# Patient Record
Sex: FEMALE | Race: WHITE | NOT HISPANIC OR LATINO | Employment: FULL TIME | URBAN - METROPOLITAN AREA
[De-identification: names, ages, dates, MRNs, and addresses within clinical notes are randomized per-mention and may not be internally consistent; named-entity substitution may affect disease eponyms.]

---

## 2017-01-05 ENCOUNTER — TELEPHONE (OUTPATIENT)
Dept: ENDOCRINOLOGY | Facility: CLINIC | Age: 52
End: 2017-01-05

## 2017-01-27 ENCOUNTER — OFFICE VISIT (OUTPATIENT)
Dept: ENDOCRINOLOGY | Facility: CLINIC | Age: 52
End: 2017-01-27
Payer: COMMERCIAL

## 2017-01-27 VITALS
HEART RATE: 94 BPM | HEIGHT: 64 IN | DIASTOLIC BLOOD PRESSURE: 72 MMHG | WEIGHT: 170 LBS | BODY MASS INDEX: 29.02 KG/M2 | SYSTOLIC BLOOD PRESSURE: 122 MMHG

## 2017-01-27 DIAGNOSIS — E03.9 HYPOTHYROIDISM, UNSPECIFIED TYPE: Primary | ICD-10-CM

## 2017-01-27 DIAGNOSIS — E55.9 VITAMIN D DEFICIENCY DISEASE: ICD-10-CM

## 2017-01-27 DIAGNOSIS — E28.2 POLYCYSTIC OVARY: ICD-10-CM

## 2017-01-27 DIAGNOSIS — E03.9 HYPOTHYROIDISM: ICD-10-CM

## 2017-01-27 PROCEDURE — 99999 PR PBB SHADOW E&M-EST. PATIENT-LVL III: CPT | Mod: PBBFAC,,, | Performed by: INTERNAL MEDICINE

## 2017-01-27 PROCEDURE — 99214 OFFICE O/P EST MOD 30 MIN: CPT | Mod: S$GLB,,, | Performed by: INTERNAL MEDICINE

## 2017-01-27 PROCEDURE — 1159F MED LIST DOCD IN RCRD: CPT | Mod: S$GLB,,, | Performed by: INTERNAL MEDICINE

## 2017-01-27 RX ORDER — LIOTHYRONINE SODIUM 5 UG/1
10 TABLET ORAL DAILY
Qty: 60 TABLET | Refills: 11 | Status: SHIPPED | OUTPATIENT
Start: 2017-01-27 | End: 2017-01-31

## 2017-01-27 RX ORDER — LIOTHYRONINE SODIUM 5 UG/1
TABLET ORAL
Qty: 60 TABLET | Refills: 0 | Status: CANCELLED | OUTPATIENT
Start: 2017-01-27

## 2017-01-27 RX ORDER — THYROID 90 MG/1
1 TABLET ORAL DAILY
Qty: 30 TABLET | Refills: 11 | Status: SHIPPED | OUTPATIENT
Start: 2017-01-27 | End: 2018-03-20 | Stop reason: SDUPTHER

## 2017-01-27 RX ORDER — THYROID, PORCINE 90 MG/1
TABLET ORAL
Qty: 30 TABLET | Refills: 0 | Status: CANCELLED | OUTPATIENT
Start: 2017-01-27

## 2017-01-27 NOTE — MR AVS SNAPSHOT
[facility]Metab  [address]  [facility] [address]  Phone: [phone]  Fax: [phone]                  Laly Culp   2017 9:00 AM   Office Visit    Description:  Female : 1965   Provider:  Tamiko Cleveland MD   Department:  [facility]/Diab[facility]Metab           Reason for Visit     Follow-up           Diagnoses this Visit        Comments    Hypothyroidism, unspecified type    -  Primary     Vitamin D deficiency disease         Polycystic ovary                To Do List           Goals (5 Years of Data)     None       These Medications        Disp Refills Start End    thyroid, pork, (ARMOUR THYROID) 90 mg Tab 30 tablet 11 2017     Take 1 tablet (90 mg total) by mouth once daily. - Oral    Pharmacy: [facility] [address] [place] [address] (), [place] - [address] Ph #: [phone]       liothyronine (CYTOMEL) 5 MCG Tab 60 tablet 11 2017     Take 2 tablets (10 mcg total) by mouth once daily. - Oral    Pharmacy: [address] (), [place] - [address] Ph #: [phone]         [facility] On Call     [facility] On Call Nurse [facility] -  Assistance  Registered nurses in the [facility] Center provide clinical advisement, health education, appointment booking, and other advisory services.  Call for this free service at [phone].             Medications           Message regarding Medications     Verify the changes and/or additions to your medication regime listed below are the same as discussed with your clinician today.  If any of these changes or additions are incorrect, please notify your healthcare provider.        CHANGE how you are taking these medications     Start Taking Instead of    thyroid, pork, (ARMOUR THYROID) 90 mg Tab thyroid, pork, (ARMOUR THYROID) 90 mg Tab    Dosage:  Take 1 tablet (90 mg total) by mouth once daily. Dosage:  Take 1 tablet by mouth once daily.    Reason for Change:  Reorder           "  Verify that the below list of medications is an accurate representation of the medications you are currently taking.  If none reported, the list may be blank. If incorrect, please contact your healthcare provider. Carry this list with you in case of emergency.           Current Medications     cholecalciferol, vitamin D3, 2,000 unit Tab Take 2,000 Units by mouth once daily.    cyanocobalamin 1,000 mcg/mL injection Inject 1 mL (1,000 mcg total) into the muscle every 14 (fourteen) days. Please give 6, 3 cc syringes with needle 22 g 1.5" and 2 refills    ergocalciferol (ERGOCALCIFEROL) 50,000 unit Cap Take 1 capsule (50,000 Units total) by mouth every 7 days.    galantamine (RAZADYNE ER) 16 MG 24 hr capsule Take by mouth. 1 Cap,Ext Release Pellets 24 hr Oral Every day    liothyronine (CYTOMEL) 5 MCG Tab Take 2 tablets (10 mcg total) by mouth once daily.    LORATADINE (CLARITIN ORAL) Take by mouth.    metformin (GLUCOPHAGE) 500 MG tablet TAKE ONE TABLET BY MOUTH ONCE DAILY    metoprolol tartrate (LOPRESSOR) 25 MG tablet Take 25 mg by mouth once daily.    naltrexone-bupropion (CONTRAVE) 8-90 mg TbSR Take 1 tablet by mouth 2 (two) times daily.    progesterone (PROMETRIUM) 100 MG capsule Take 100 mg by mouth once daily.    thyroid, pork, (ARMOUR THYROID) 90 mg Tab Take 1 tablet (90 mg total) by mouth once daily.           Clinical Reference Information           Vital Signs - Last Recorded  Most recent update: 1/27/2017  9:26 AM by Ashley Meléndez LPN    BP Pulse Ht Wt LMP BMI    122/72 94 5' 4" (1.626 m) 77.1 kg (169 lb 15.6 oz) 11/14/2016 29.18 kg/m2      Blood Pressure          Most Recent Value    BP  122/72      Allergies as of 1/27/2017     Omeprazole    Pantoprazole      Immunizations Administered on Date of Encounter - 1/27/2017     None      Orders Placed During Today's Visit     Future Labs/Procedures Expected by Expires    US Soft Tissue Head Neck Thyroid  1/27/2017 1/27/2018      "

## 2017-01-27 NOTE — PROGRESS NOTES
Subjective:      Patient ID: Laly Culp is a 51 y.o. female.    Chief Complaint:  Follow-up    History of Present Illness    F/u of    New patient for me    51 y/old female with Hypothyroidism since April of 2010 and negative thyroid antibodies that is taking [place] Thyroid 90 mg daily 6 days a week and Cytomel 10 mcg daily.     For PCOS she is taking Metformin 500 mg daily. Get estrogen pellets and progesterone with hormonal doctor     Feels more hot than cold. No diarrhea or constipation. + Hair loss. + Dry skin. No change in facial hair. No swelling of the legs. Weight loss of 11 lbs since last visit on 1/8/16.    No bone fractures. No kidney stones. She is now taking Contrave to help with weight loss by her cardiologist   Had tried with holistic doctor for her medical problems in the past.    On gluten free diet   She said she tries for do gluten free diet     She does not have a new labs to review  Needs refill today     Review of Systems   Constitutional: Negative for chills, fever and unexpected weight change (on contrave with her cardiologist ).   HENT: Negative for trouble swallowing.         Occasional   Eyes: Negative for visual disturbance.   Respiratory:        Has less SALTER   Cardiovascular: Negative for chest pain, palpitations and leg swelling.   Gastrointestinal: Negative for constipation, diarrhea, nausea and vomiting.   Endocrine: Positive for heat intolerance (chornic but getting better).   Musculoskeletal: Positive for neck pain. Negative for arthralgias.   Neurological: Negative for headaches.   Hematological: Bruises/bleeds easily.   Psychiatric/Behavioral: Negative for sleep disturbance.   All other systems reviewed and are negative.      Objective:   Physical Exam   Constitutional: No distress.   Eyes: Right eye exhibits no discharge. Left eye exhibits no discharge.   Neck: Thyromegaly (thyroid palpable, no dicrete nodules palpated ) present.   Pulmonary/Chest: No respiratory  distress.   Neurological: She is alert.   Skin: She is not diaphoretic.   Psychiatric: She has a normal mood and affect. Judgment and thought content normal.       Lab Review:  7/5/16 by [employer]  - CMP=WNL except Glucose=102 (H), BUN=6 (L)  - TSH= 0.01 (L), FreeT4=0.8, FreeT3=3.3  - Iron=66, %Sat=18, YAT=257  - Vitamin B 61=620  - Vitamin D =20 (L)  - Mg=2.0    No new labs done       Assessment:     No diagnosis found.     1. Hypothyroidism and she is clinically euthyroid but biochemically is hyperthyroid with a low TSH level.     On  Jeffers thyroid to 83 mg daily and she will do this by taking 90 mg daily except for Sundays  On Cytomel 10 mcg daily  I dsicussed with Ms.Angela FITO Culp physiology and pathology of thyroid gland as well thyroid disease  We did discuss about armour thyroid and cytomel not a good combination as it provides high amount of T3 and with suppressed TSH puts higher risk of bone loss and CVrisk   She understands and she declined to make any changes in her regimen as  was giving her these regimen.   I of erred to refill this time even I do nto agree with regimen and ask her to find another Endocrinologist meanwhile   Stressed on keeping TSH,FT4, FT3 at normal range     Get TSH, FT4, FT3 and send us     # thyromegaly   - get US thyroid     # PCOS with irregular menses and she is clinically stable. She is on estrogen pellets every 6 months and progesterone 100 mg daily. To continue taking Metformin 500 mg daily.     # overweight   Body mass index is 29.18 kg/(m^2).  On contrave  Discussed that thyroid medication is not for weight loss and should not be used as medication for weight loss    Patient understands risks of over treatment and still would like to continue current regimen     # vitamin D deficiency   Check vit d       Plan:

## 2017-01-31 ENCOUNTER — TELEPHONE (OUTPATIENT)
Dept: ENDOCRINOLOGY | Facility: CLINIC | Age: 52
End: 2017-01-31

## 2017-01-31 DIAGNOSIS — E55.9 VITAMIN D DEFICIENCY DISEASE: ICD-10-CM

## 2017-01-31 RX ORDER — ERGOCALCIFEROL 1.25 MG/1
100000 CAPSULE ORAL
Qty: 24 CAPSULE | Refills: 2 | Status: SHIPPED | OUTPATIENT
Start: 2017-01-31

## 2017-01-31 NOTE — TELEPHONE ENCOUNTER
Please call Ms.Laly Culp and let her know I reviewed her labs   Her TSH is suppressed indicates over treatment  At that same time her free T3 levels is very high ( again over treatment )  My plan is to stop her cytomel, continue her armour thyroid and repeat labs in 2 months. Please ask her if she would like to get labs with [facility] or external? And let me know     Also her vitamin D is still low so advise  to take ergo to 100K weekly and repeat vitamin D level in 2 months as well

## 2017-02-03 ENCOUNTER — PATIENT MESSAGE (OUTPATIENT)
Dept: ENDOCRINOLOGY | Facility: CLINIC | Age: 52
End: 2017-02-03

## 2017-06-12 ENCOUNTER — TELEPHONE (OUTPATIENT)
Dept: RADIOLOGY | Facility: HOSPITAL | Age: 52
End: 2017-06-12

## 2017-06-13 ENCOUNTER — HOSPITAL ENCOUNTER (OUTPATIENT)
Dept: RADIOLOGY | Facility: HOSPITAL | Age: 52
Discharge: HOME | End: 2017-06-13
Attending: INTERNAL MEDICINE
Payer: COMMERCIAL

## 2017-06-13 DIAGNOSIS — E03.9 HYPOTHYROIDISM, UNSPECIFIED TYPE: ICD-10-CM

## 2017-06-13 DIAGNOSIS — E55.9 VITAMIN D DEFICIENCY DISEASE: ICD-10-CM

## 2017-06-13 DIAGNOSIS — E28.2 POLYCYSTIC OVARY: ICD-10-CM

## 2017-06-13 PROCEDURE — 76536 US EXAM OF HEAD AND NECK: CPT | Mod: TC,PO

## 2017-06-13 PROCEDURE — 76536 US EXAM OF HEAD AND NECK: CPT | Mod: 26,,, | Performed by: RADIOLOGY

## 2017-06-14 ENCOUNTER — PATIENT MESSAGE (OUTPATIENT)
Dept: ENDOCRINOLOGY | Facility: CLINIC | Age: 52
End: 2017-06-14

## 2017-06-14 DIAGNOSIS — E04.9 GOITER: Primary | ICD-10-CM

## 2017-06-14 NOTE — TELEPHONE ENCOUNTER
subcentimeter thyroid nodule present   Plan to repeat US thyroid in 1 year f/u in 1 year     Sent [facility] message to patient explaining the plan.

## 2018-03-20 RX ORDER — THYROID, PORCINE 90 MG/1
TABLET ORAL
Qty: 30 TABLET | Refills: 0 | Status: SHIPPED | OUTPATIENT
Start: 2018-03-20 | End: 2018-04-30 | Stop reason: SDUPTHER

## 2018-03-20 RX ORDER — LIOTHYRONINE SODIUM 5 UG/1
TABLET ORAL
Qty: 60 TABLET | Refills: 0 | Status: SHIPPED | OUTPATIENT
Start: 2018-03-20 | End: 2018-04-30 | Stop reason: SDUPTHER

## 2018-05-02 RX ORDER — LIOTHYRONINE SODIUM 5 UG/1
TABLET ORAL
Qty: 60 TABLET | Refills: 0 | Status: SHIPPED | OUTPATIENT
Start: 2018-05-02

## 2018-05-02 RX ORDER — THYROID, PORCINE 90 MG/1
TABLET ORAL
Qty: 30 TABLET | Refills: 0 | Status: SHIPPED | OUTPATIENT
Start: 2018-05-02

## 2018-06-15 RX ORDER — LIOTHYRONINE SODIUM 5 UG/1
10 TABLET ORAL DAILY
Qty: 60 TABLET | Refills: 0 | OUTPATIENT
Start: 2018-06-15

## 2018-06-15 NOTE — TELEPHONE ENCOUNTER
Faxed denial of refill request for Liothyronine to [facility] [phone] as ordered per Dr. Seo.  Patient will need to make a follow up appointment.